# Patient Record
Sex: FEMALE | Race: OTHER | HISPANIC OR LATINO
[De-identification: names, ages, dates, MRNs, and addresses within clinical notes are randomized per-mention and may not be internally consistent; named-entity substitution may affect disease eponyms.]

---

## 2021-09-20 ENCOUNTER — ASOB RESULT (OUTPATIENT)
Age: 31
End: 2021-09-20

## 2021-09-20 ENCOUNTER — APPOINTMENT (OUTPATIENT)
Dept: ANTEPARTUM | Facility: CLINIC | Age: 31
End: 2021-09-20
Payer: MEDICAID

## 2021-09-20 PROBLEM — Z00.00 ENCOUNTER FOR PREVENTIVE HEALTH EXAMINATION: Status: ACTIVE | Noted: 2021-09-20

## 2021-09-20 PROCEDURE — 76805 OB US >/= 14 WKS SNGL FETUS: CPT

## 2021-10-18 ENCOUNTER — INPATIENT (INPATIENT)
Facility: HOSPITAL | Age: 31
LOS: 1 days | Discharge: HOME | End: 2021-10-20
Attending: OBSTETRICS & GYNECOLOGY | Admitting: OBSTETRICS & GYNECOLOGY
Payer: MEDICAID

## 2021-10-18 ENCOUNTER — TRANSCRIPTION ENCOUNTER (OUTPATIENT)
Age: 31
End: 2021-10-18

## 2021-10-18 VITALS
RESPIRATION RATE: 18 BRPM | TEMPERATURE: 98 F | HEIGHT: 62.2 IN | SYSTOLIC BLOOD PRESSURE: 131 MMHG | HEART RATE: 81 BPM | DIASTOLIC BLOOD PRESSURE: 61 MMHG | WEIGHT: 158.07 LBS

## 2021-10-18 DIAGNOSIS — O47.1 FALSE LABOR AT OR AFTER 37 COMPLETED WEEKS OF GESTATION: ICD-10-CM

## 2021-10-18 LAB
BASOPHILS # BLD AUTO: 0.04 K/UL — SIGNIFICANT CHANGE UP (ref 0–0.2)
BASOPHILS NFR BLD AUTO: 0.4 % — SIGNIFICANT CHANGE UP (ref 0–2)
EOSINOPHIL # BLD AUTO: 0.04 K/UL — SIGNIFICANT CHANGE UP (ref 0–0.5)
EOSINOPHIL NFR BLD AUTO: 0.4 % — SIGNIFICANT CHANGE UP (ref 0–6)
HCT VFR BLD CALC: 36.6 % — SIGNIFICANT CHANGE UP (ref 34.5–45)
HGB BLD-MCNC: 12.7 G/DL — SIGNIFICANT CHANGE UP (ref 11.5–15.5)
IMM GRANULOCYTES NFR BLD AUTO: 0.3 % — SIGNIFICANT CHANGE UP (ref 0–1.5)
LYMPHOCYTES # BLD AUTO: 1.57 K/UL — SIGNIFICANT CHANGE UP (ref 1–3.3)
LYMPHOCYTES # BLD AUTO: 16.2 % — SIGNIFICANT CHANGE UP (ref 13–44)
MCHC RBC-ENTMCNC: 32.4 PG — SIGNIFICANT CHANGE UP (ref 27–34)
MCHC RBC-ENTMCNC: 34.7 GM/DL — SIGNIFICANT CHANGE UP (ref 32–36)
MCV RBC AUTO: 93.4 FL — SIGNIFICANT CHANGE UP (ref 80–100)
MONOCYTES # BLD AUTO: 0.66 K/UL — SIGNIFICANT CHANGE UP (ref 0–0.9)
MONOCYTES NFR BLD AUTO: 6.8 % — SIGNIFICANT CHANGE UP (ref 2–14)
NEUTROPHILS # BLD AUTO: 7.34 K/UL — SIGNIFICANT CHANGE UP (ref 1.8–7.4)
NEUTROPHILS NFR BLD AUTO: 75.9 % — SIGNIFICANT CHANGE UP (ref 43–77)
PLATELET # BLD AUTO: 196 K/UL — SIGNIFICANT CHANGE UP (ref 150–400)
RBC # BLD: 3.92 M/UL — SIGNIFICANT CHANGE UP (ref 3.8–5.2)
RBC # FLD: 12.6 % — SIGNIFICANT CHANGE UP (ref 10.3–14.5)
WBC # BLD: 9.68 K/UL — SIGNIFICANT CHANGE UP (ref 3.8–10.5)
WBC # FLD AUTO: 9.68 K/UL — SIGNIFICANT CHANGE UP (ref 3.8–10.5)

## 2021-10-18 RX ORDER — ACETAMINOPHEN 500 MG
975 TABLET ORAL
Refills: 0 | Status: DISCONTINUED | OUTPATIENT
Start: 2021-10-18 | End: 2021-10-20

## 2021-10-18 RX ORDER — DIPHENHYDRAMINE HCL 50 MG
25 CAPSULE ORAL EVERY 6 HOURS
Refills: 0 | Status: DISCONTINUED | OUTPATIENT
Start: 2021-10-18 | End: 2021-10-20

## 2021-10-18 RX ORDER — LANOLIN
1 OINTMENT (GRAM) TOPICAL EVERY 6 HOURS
Refills: 0 | Status: DISCONTINUED | OUTPATIENT
Start: 2021-10-18 | End: 2021-10-20

## 2021-10-18 RX ORDER — OXYCODONE HYDROCHLORIDE 5 MG/1
5 TABLET ORAL ONCE
Refills: 0 | Status: DISCONTINUED | OUTPATIENT
Start: 2021-10-18 | End: 2021-10-20

## 2021-10-18 RX ORDER — DIBUCAINE 1 %
1 OINTMENT (GRAM) RECTAL EVERY 6 HOURS
Refills: 0 | Status: DISCONTINUED | OUTPATIENT
Start: 2021-10-18 | End: 2021-10-20

## 2021-10-18 RX ORDER — TETANUS TOXOID, REDUCED DIPHTHERIA TOXOID AND ACELLULAR PERTUSSIS VACCINE, ADSORBED 5; 2.5; 8; 8; 2.5 [IU]/.5ML; [IU]/.5ML; UG/.5ML; UG/.5ML; UG/.5ML
0.5 SUSPENSION INTRAMUSCULAR ONCE
Refills: 0 | Status: DISCONTINUED | OUTPATIENT
Start: 2021-10-18 | End: 2021-10-20

## 2021-10-18 RX ORDER — OXYTOCIN 10 UNIT/ML
333.33 VIAL (ML) INJECTION
Qty: 20 | Refills: 0 | Status: DISCONTINUED | OUTPATIENT
Start: 2021-10-18 | End: 2021-10-19

## 2021-10-18 RX ORDER — KETOROLAC TROMETHAMINE 30 MG/ML
30 SYRINGE (ML) INJECTION ONCE
Refills: 0 | Status: DISCONTINUED | OUTPATIENT
Start: 2021-10-18 | End: 2021-10-18

## 2021-10-18 RX ORDER — PRAMOXINE HYDROCHLORIDE 150 MG/15G
1 AEROSOL, FOAM RECTAL EVERY 4 HOURS
Refills: 0 | Status: DISCONTINUED | OUTPATIENT
Start: 2021-10-18 | End: 2021-10-20

## 2021-10-18 RX ORDER — BENZOCAINE 10 %
1 GEL (GRAM) MUCOUS MEMBRANE EVERY 6 HOURS
Refills: 0 | Status: DISCONTINUED | OUTPATIENT
Start: 2021-10-18 | End: 2021-10-20

## 2021-10-18 RX ORDER — AER TRAVELER 0.5 G/1
1 SOLUTION RECTAL; TOPICAL EVERY 4 HOURS
Refills: 0 | Status: DISCONTINUED | OUTPATIENT
Start: 2021-10-18 | End: 2021-10-20

## 2021-10-18 RX ORDER — IBUPROFEN 200 MG
600 TABLET ORAL EVERY 6 HOURS
Refills: 0 | Status: COMPLETED | OUTPATIENT
Start: 2021-10-18 | End: 2022-09-16

## 2021-10-18 RX ORDER — OXYCODONE HYDROCHLORIDE 5 MG/1
5 TABLET ORAL
Refills: 0 | Status: DISCONTINUED | OUTPATIENT
Start: 2021-10-18 | End: 2021-10-20

## 2021-10-18 RX ORDER — OXYTOCIN 10 UNIT/ML
333.33 VIAL (ML) INJECTION
Qty: 20 | Refills: 0 | Status: DISCONTINUED | OUTPATIENT
Start: 2021-10-18 | End: 2021-10-20

## 2021-10-18 RX ORDER — SODIUM CHLORIDE 9 MG/ML
3 INJECTION INTRAMUSCULAR; INTRAVENOUS; SUBCUTANEOUS EVERY 8 HOURS
Refills: 0 | Status: DISCONTINUED | OUTPATIENT
Start: 2021-10-18 | End: 2021-10-20

## 2021-10-18 RX ORDER — MAGNESIUM HYDROXIDE 400 MG/1
30 TABLET, CHEWABLE ORAL
Refills: 0 | Status: DISCONTINUED | OUTPATIENT
Start: 2021-10-18 | End: 2021-10-20

## 2021-10-18 RX ORDER — HYDROCORTISONE 1 %
1 OINTMENT (GRAM) TOPICAL EVERY 6 HOURS
Refills: 0 | Status: DISCONTINUED | OUTPATIENT
Start: 2021-10-18 | End: 2021-10-20

## 2021-10-18 RX ORDER — SODIUM CHLORIDE 9 MG/ML
1000 INJECTION, SOLUTION INTRAVENOUS
Refills: 0 | Status: DISCONTINUED | OUTPATIENT
Start: 2021-10-18 | End: 2021-10-19

## 2021-10-18 RX ORDER — CITRIC ACID/SODIUM CITRATE 300-500 MG
30 SOLUTION, ORAL ORAL ONCE
Refills: 0 | Status: DISCONTINUED | OUTPATIENT
Start: 2021-10-18 | End: 2021-10-19

## 2021-10-18 RX ORDER — SIMETHICONE 80 MG/1
80 TABLET, CHEWABLE ORAL EVERY 4 HOURS
Refills: 0 | Status: DISCONTINUED | OUTPATIENT
Start: 2021-10-18 | End: 2021-10-20

## 2021-10-18 RX ADMIN — Medication 30 MILLIGRAM(S): at 23:30

## 2021-10-18 RX ADMIN — Medication 30 MILLIGRAM(S): at 23:26

## 2021-10-18 RX ADMIN — Medication 1000 MILLIUNIT(S)/MIN: at 23:27

## 2021-10-18 NOTE — OB PROVIDER H&P - NSHPPHYSICALEXAM_GEN_ALL_CORE
Gen: AAOx3; in apparent pain with ctx  Resp: breathing comfortably on RA  Abd: Soft, gravid, nontender  SVE: 8, 80, 0, soft, intact bulging membranes  Bedside sono: cephalic, posterior placenta  FHT: baseline 135, moderate variability, +accels, -decels  [place]: ctx q2m

## 2021-10-18 NOTE — OB RN DELIVERY SUMMARY - NSSELHIDDEN_OBGYN_ALL_OB_FT
[NS_DeliveryAttending1_OBGYN_ALL_OB_FT:[id number]==],[NS_DeliveryAssist1_OBGYN_ALL_OB_FT:[email or web address]=],[NS_DeliveryRN_OBGYN_ALL_OB_FT:[id number]=]

## 2021-10-18 NOTE — OB PROVIDER DELIVERY SUMMARY - NSSELHIDDEN_OBGYN_ALL_OB_FT
[NS_DeliveryAttending1_OBGYN_ALL_OB_FT:[id number]==],[NS_DeliveryAssist1_OBGYN_ALL_OB_FT:[email or web address]=],[NS_DeliveryRN_OBGYN_ALL_OB_FT:[email or web address]=]

## 2021-10-18 NOTE — OB PROVIDER H&P - HISTORY OF PRESENT ILLNESS
31y  at 37w4d weeks GA by LMP who presents to L&D with painful, frequent ctx since  tonight. Patient denies vaginal bleeding and leakage of fluid. She endorses good fetal movement. Denies fevers, chills, nausea, vomiting, chest pain, SOB, dizziness and headache. No other complaints at this time.     KEILA: 21  LMP: 21    Prenatal course is significant for:  late NIKKIE from [facility]    POB: FT NSVDx1 2019  PGYN: -fibroids, -ovarian cysts, denies STD hx, denies abnormal PAPs   PMH: Denies  PSH: Denies  SH: Denies EtOH, tobacco and illicit drug use during this pregnancy; feels safe at home   Meds: PNVs  Allergies: NKDA    21  BMI: 25.97  Sono: vtx, post placenta  EFW: 2286g

## 2021-10-18 NOTE — OB PROVIDER DELIVERY SUMMARY - NSPROVIDERDELIVERYNOTE_OBGYN_ALL_OB_FT
pt presented in active labor, ctx frequently and 8cm dilated.  AROM clear, progressed to 10cm dilated.    @2256, nuchalx1 reduced, m apgars 9/9.  rt labial tear, not bleeding, no repair indicated.   Hemostasis noted, EBL 27.  Count correct x2

## 2021-10-18 NOTE — OB PROVIDER H&P - ASSESSMENT
31y  @37w4d admitted in active labor.     A/P:   -Admit to L&D  -Consent  -Admission labs  -NPO, except ice chips   -IV fluids  -Fetus: Cat I tracing. Continuous toco and fetal monitoring.   -GBS: Negative, no GBS ppx required   -Analgesia: epidural PRN    Discussed with Dr. Greene

## 2021-10-18 NOTE — OB RN DELIVERY SUMMARY - NS_SEPSISRSKCALC_OBGYN_ALL_OB_FT
EOS calculated successfully. EOS Risk Factor: 0.5/1000 live births ([facility] national incidence); GA=37w4d; Temp=97.9; ROM=0.117; GBS='Negative'; Antibiotics='No antibiotics or any antibiotics < 2 hrs prior to birth'

## 2021-10-19 LAB
ABO RH CONFIRMATION: SIGNIFICANT CHANGE UP
ALBUMIN SERPL ELPH-MCNC: 3.7 G/DL — SIGNIFICANT CHANGE UP (ref 3.3–5.2)
ALP SERPL-CCNC: 129 U/L — HIGH (ref 40–120)
ALT FLD-CCNC: 13 U/L — SIGNIFICANT CHANGE UP
ANION GAP SERPL CALC-SCNC: 16 MMOL/L — SIGNIFICANT CHANGE UP (ref 5–17)
APTT BLD: 26.1 SEC — LOW (ref 27.5–35.5)
AST SERPL-CCNC: 17 U/L — SIGNIFICANT CHANGE UP
BILIRUB SERPL-MCNC: <0.2 MG/DL — LOW (ref 0.4–2)
BLD GP AB SCN SERPL QL: SIGNIFICANT CHANGE UP
BUN SERPL-MCNC: 15.3 MG/DL — SIGNIFICANT CHANGE UP (ref 8–20)
CALCIUM SERPL-MCNC: 8.9 MG/DL — SIGNIFICANT CHANGE UP (ref 8.6–10.2)
CHLORIDE SERPL-SCNC: 103 MMOL/L — SIGNIFICANT CHANGE UP (ref 98–107)
CO2 SERPL-SCNC: 18 MMOL/L — LOW (ref 22–29)
COVID-19 SPIKE DOMAIN AB INTERP: POSITIVE
COVID-19 SPIKE DOMAIN ANTIBODY RESULT: >250 U/ML — HIGH
CREAT SERPL-MCNC: 0.55 MG/DL — SIGNIFICANT CHANGE UP (ref 0.5–1.3)
FIBRINOGEN PPP-MCNC: 682 MG/DL — HIGH (ref 290–520)
GLUCOSE SERPL-MCNC: 83 MG/DL — SIGNIFICANT CHANGE UP (ref 70–99)
HCT VFR BLD CALC: 32.5 % — LOW (ref 34.5–45)
HGB BLD-MCNC: 11.2 G/DL — LOW (ref 11.5–15.5)
INR BLD: 0.89 RATIO — SIGNIFICANT CHANGE UP (ref 0.88–1.16)
LDH SERPL L TO P-CCNC: 214 U/L — HIGH (ref 98–192)
POTASSIUM SERPL-MCNC: 3.6 MMOL/L — SIGNIFICANT CHANGE UP (ref 3.5–5.3)
POTASSIUM SERPL-SCNC: 3.6 MMOL/L — SIGNIFICANT CHANGE UP (ref 3.5–5.3)
PROT SERPL-MCNC: 7 G/DL — SIGNIFICANT CHANGE UP (ref 6.6–8.7)
PROTHROM AB SERPL-ACNC: 10.4 SEC — LOW (ref 10.6–13.6)
SARS-COV-2 IGG+IGM SERPL QL IA: >250 U/ML — HIGH
SARS-COV-2 IGG+IGM SERPL QL IA: POSITIVE
SARS-COV-2 RNA SPEC QL NAA+PROBE: SIGNIFICANT CHANGE UP
SODIUM SERPL-SCNC: 137 MMOL/L — SIGNIFICANT CHANGE UP (ref 135–145)
URATE SERPL-MCNC: 4.4 MG/DL — SIGNIFICANT CHANGE UP (ref 2.4–5.7)

## 2021-10-19 RX ORDER — IBUPROFEN 200 MG
600 TABLET ORAL EVERY 6 HOURS
Refills: 0 | Status: DISCONTINUED | OUTPATIENT
Start: 2021-10-19 | End: 2021-10-20

## 2021-10-19 RX ADMIN — Medication 600 MILLIGRAM(S): at 19:28

## 2021-10-19 RX ADMIN — Medication 975 MILLIGRAM(S): at 09:10

## 2021-10-19 RX ADMIN — Medication 1 TABLET(S): at 11:55

## 2021-10-19 RX ADMIN — Medication 600 MILLIGRAM(S): at 12:55

## 2021-10-19 RX ADMIN — Medication 600 MILLIGRAM(S): at 11:56

## 2021-10-19 RX ADMIN — SODIUM CHLORIDE 3 MILLILITER(S): 9 INJECTION INTRAMUSCULAR; INTRAVENOUS; SUBCUTANEOUS at 22:36

## 2021-10-19 RX ADMIN — Medication 975 MILLIGRAM(S): at 10:10

## 2021-10-19 RX ADMIN — Medication 975 MILLIGRAM(S): at 16:45

## 2021-10-19 RX ADMIN — Medication 975 MILLIGRAM(S): at 15:47

## 2021-10-19 RX ADMIN — Medication 600 MILLIGRAM(S): at 18:34

## 2021-10-19 NOTE — DISCHARGE NOTE OB - CARE PLAN
1 Principal Discharge DX:	 (normal spontaneous vaginal delivery)  Assessment and plan of treatment:	Please call your provider to schedule postpartum visit in 6 weeks. Take medications as directed, regular diet, activity as tolerated. Exclusive breast feeding for the first 6 months is recommended. Nothing per vagina for 6 weeks (incl. sex, douching, etc). If you have additional concerns, please inform your provider.

## 2021-10-19 NOTE — PROGRESS NOTE ADULT - ASSESSMENT
A/P: 31y  now PPD#1 s/p spontaneous vaginal delivery at 37.4 weeks gestation, uncomplicated.  -Vital signs stable  -Hgb: 12.7 -> AM labs pending   -Voiding, tolerating PO, bowel function nml   -Advance care as tolerated   -Continue routine postpartum care and education  -Healthy male infant, declines circumcision  -Dispo: Pt is stable for discharge to home pending attending approval.

## 2021-10-19 NOTE — DISCHARGE NOTE OB - PATIENT PORTAL LINK FT
You can access the FollowMyHealth Patient Portal offered by [facility] by registering at the following website: [email or web address]. By joining [facility]’s FollowMyHealth portal, you will also be able to view your health information using other applications (apps) compatible with our system.

## 2021-10-19 NOTE — DISCHARGE NOTE OB - HOSPITAL COURSE
Patient underwent a normal spontaneous vaginal delivery. Post-partum course was uncomplicated. Pain is well controlled with PRN medication. She has no difficulty with ambulation, voiding, or PO intake. Lab values and vital signs are stable prior to discharge.

## 2021-10-19 NOTE — DISCHARGE NOTE OB - CARE PROVIDER_API CALL
Jt Mckeon)  Obstetrics and Gynecology  [address] [place] [address]  Phone: [phone]  Fax: [phone]  Follow Up Time:

## 2021-10-20 VITALS
RESPIRATION RATE: 18 BRPM | DIASTOLIC BLOOD PRESSURE: 80 MMHG | HEART RATE: 72 BPM | SYSTOLIC BLOOD PRESSURE: 128 MMHG | TEMPERATURE: 98 F

## 2021-10-20 LAB
MEV IGG SER-ACNC: 12.5 AU/ML — SIGNIFICANT CHANGE UP
MEV IGG+IGM SER-IMP: NEGATIVE — SIGNIFICANT CHANGE UP
T PALLIDUM AB TITR SER: NEGATIVE — SIGNIFICANT CHANGE UP

## 2021-10-20 PROCEDURE — 85610 PROTHROMBIN TIME: CPT

## 2021-10-20 PROCEDURE — 90707 MMR VACCINE SC: CPT

## 2021-10-20 PROCEDURE — 85384 FIBRINOGEN ACTIVITY: CPT

## 2021-10-20 PROCEDURE — 59050 FETAL MONITOR W/REPORT: CPT

## 2021-10-20 PROCEDURE — 87635 SARS-COV-2 COVID-19 AMP PRB: CPT

## 2021-10-20 PROCEDURE — 85014 HEMATOCRIT: CPT

## 2021-10-20 PROCEDURE — 84550 ASSAY OF BLOOD/URIC ACID: CPT

## 2021-10-20 PROCEDURE — 86850 RBC ANTIBODY SCREEN: CPT

## 2021-10-20 PROCEDURE — 85018 HEMOGLOBIN: CPT

## 2021-10-20 PROCEDURE — 85730 THROMBOPLASTIN TIME PARTIAL: CPT

## 2021-10-20 PROCEDURE — 36415 COLL VENOUS BLD VENIPUNCTURE: CPT

## 2021-10-20 PROCEDURE — G0463: CPT

## 2021-10-20 PROCEDURE — 85025 COMPLETE CBC W/AUTO DIFF WBC: CPT

## 2021-10-20 PROCEDURE — 80053 COMPREHEN METABOLIC PANEL: CPT

## 2021-10-20 PROCEDURE — 86901 BLOOD TYPING SEROLOGIC RH(D): CPT

## 2021-10-20 PROCEDURE — 83615 LACTATE (LD) (LDH) ENZYME: CPT

## 2021-10-20 PROCEDURE — 86765 RUBEOLA ANTIBODY: CPT

## 2021-10-20 PROCEDURE — 59025 FETAL NON-STRESS TEST: CPT

## 2021-10-20 PROCEDURE — 86769 SARS-COV-2 COVID-19 ANTIBODY: CPT

## 2021-10-20 PROCEDURE — 86900 BLOOD TYPING SEROLOGIC ABO: CPT

## 2021-10-20 PROCEDURE — 86780 TREPONEMA PALLIDUM: CPT

## 2021-10-20 RX ORDER — ACETAMINOPHEN 500 MG
3 TABLET ORAL
Qty: 0 | Refills: 0 | DISCHARGE
Start: 2021-10-20

## 2021-10-20 RX ORDER — IBUPROFEN 200 MG
1 TABLET ORAL
Qty: 0 | Refills: 0 | DISCHARGE
Start: 2021-10-20

## 2021-10-20 RX ADMIN — Medication 0.5 MILLILITER(S): at 10:38

## 2021-10-20 RX ADMIN — SODIUM CHLORIDE 3 MILLILITER(S): 9 INJECTION INTRAMUSCULAR; INTRAVENOUS; SUBCUTANEOUS at 05:57

## 2021-10-20 NOTE — PROGRESS NOTE ADULT - ASSESSMENT
A/P: 31y  now PPD#2 s/p spontaneous vaginal delivery at 37.4 weeks gestation, uncomplicated.  -Vital signs stable  -Hgb: 12.7 -> 11.2  -Voiding, tolerating PO, bowel function nml   -Advance care as tolerated   -Continue routine postpartum care and education  -Healthy male infant, declines circumcision  -Dispo: Pt is stable for discharge to home pending attending approval.

## 2021-10-20 NOTE — PROGRESS NOTE ADULT - ATTENDING COMMENTS
31y  now PPD#1  no complaints  doing well  abd soft, uterus firm   ext no edema  plan   Routine Pp care  DVT ppx
PPD#2  Doing well  meeting milestones  dc home today  f/u in 4-6 weeks  questions and concerns answered  ppalos

## 2022-04-28 NOTE — OB RN PATIENT PROFILE - PRO PRENATAL LABS ORI SOURCE HBSAG
This is a recent snapshot of the patient's [facility] medical record.  For current drug dose and complete information and questions, call [phone] or In Basket pool, fv home infusion ([id number])  CSN Number:  [id number]    
hard copy, drawn during this pregnancy

## 2024-01-17 PROBLEM — Z78.9 OTHER SPECIFIED HEALTH STATUS: Chronic | Status: ACTIVE | Noted: 2021-10-18

## 2024-02-12 ENCOUNTER — APPOINTMENT (OUTPATIENT)
Dept: OBGYN | Facility: CLINIC | Age: 34
End: 2024-02-12
Payer: COMMERCIAL

## 2024-02-12 ENCOUNTER — APPOINTMENT (OUTPATIENT)
Dept: ANTEPARTUM | Facility: CLINIC | Age: 34
End: 2024-02-12
Payer: COMMERCIAL

## 2024-02-12 ENCOUNTER — ASOB RESULT (OUTPATIENT)
Age: 34
End: 2024-02-12

## 2024-02-12 ENCOUNTER — LABORATORY RESULT (OUTPATIENT)
Age: 34
End: 2024-02-12

## 2024-02-12 VITALS
WEIGHT: 120 LBS | SYSTOLIC BLOOD PRESSURE: 116 MMHG | DIASTOLIC BLOOD PRESSURE: 72 MMHG | TEMPERATURE: 98 F | BODY MASS INDEX: 22.08 KG/M2 | HEIGHT: 62 IN

## 2024-02-12 DIAGNOSIS — N91.1 SECONDARY AMENORRHEA: ICD-10-CM

## 2024-02-12 DIAGNOSIS — Z31.49 ENCOUNTER FOR OTHER PROCREATIVE INVESTIGATION AND TESTING: ICD-10-CM

## 2024-02-12 DIAGNOSIS — Z11.3 ENCOUNTER FOR SCREENING FOR INFECTIONS WITH A PREDOMINANTLY SEXUAL MODE OF TRANSMISSION: ICD-10-CM

## 2024-02-12 DIAGNOSIS — Z11.51 ENCOUNTER FOR SCREENING FOR HUMAN PAPILLOMAVIRUS (HPV): ICD-10-CM

## 2024-02-12 DIAGNOSIS — Z82.69 FAMILY HISTORY OF OTHER DISEASES OF THE MUSCULOSKELETAL SYSTEM AND CONNECTIVE TISSUE: ICD-10-CM

## 2024-02-12 DIAGNOSIS — Z12.4 ENCOUNTER FOR SCREENING FOR MALIGNANT NEOPLASM OF CERVIX: ICD-10-CM

## 2024-02-12 DIAGNOSIS — Z78.9 OTHER SPECIFIED HEALTH STATUS: ICD-10-CM

## 2024-02-12 DIAGNOSIS — Z01.419 ENCOUNTER FOR GYNECOLOGICAL EXAMINATION (GENERAL) (ROUTINE) W/OUT ABNORMAL FINDINGS: ICD-10-CM

## 2024-02-12 LAB
APPEARANCE: CLEAR
BILIRUBIN URINE: NEGATIVE
BLOOD URINE: NEGATIVE
COLOR: YELLOW
GLUCOSE QUALITATIVE U: NEGATIVE
KETONES URINE: NEGATIVE
LEUKOCYTE ESTERASE URINE: NEGATIVE
NITRITE URINE: NEGATIVE
PH URINE: 8.5
PROTEIN URINE: NEGATIVE
SPECIFIC GRAVITY URINE: 1.02
UROBILINOGEN URINE: 0.2 (ref 0.2–?)

## 2024-02-12 PROCEDURE — 0500F INITIAL PRENATAL CARE VISIT: CPT

## 2024-02-12 PROCEDURE — 76817 TRANSVAGINAL US OBSTETRIC: CPT

## 2024-02-12 PROCEDURE — 99385 PREV VISIT NEW AGE 18-39: CPT

## 2024-02-13 LAB
ABO + RH PNL BLD: NORMAL
BASOPHILS # BLD AUTO: 0.07 K/UL
BASOPHILS NFR BLD AUTO: 0.7 %
BLD GP AB SCN SERPL QL: NORMAL
C TRACH RRNA SPEC QL NAA+PROBE: NOT DETECTED
EOSINOPHIL # BLD AUTO: 0.07 K/UL
EOSINOPHIL NFR BLD AUTO: 0.7 %
ESTIMATED AVERAGE GLUCOSE: 103 MG/DL
HBA1C MFR BLD HPLC: 5.2 %
HBV SURFACE AG SER QL: NONREACTIVE
HCT VFR BLD CALC: 38.1 %
HCV AB SER QL: NONREACTIVE
HCV S/CO RATIO: 0.29 S/CO
HGB BLD-MCNC: 12.1 G/DL
HIV1+2 AB SPEC QL IA.RAPID: NONREACTIVE
HPV HIGH+LOW RISK DNA PNL CVX: NOT DETECTED
IMM GRANULOCYTES NFR BLD AUTO: 0.3 %
LYMPHOCYTES # BLD AUTO: 1.81 K/UL
LYMPHOCYTES NFR BLD AUTO: 17.5 %
MAN DIFF?: NORMAL
MCHC RBC-ENTMCNC: 29.3 PG
MCHC RBC-ENTMCNC: 31.8 GM/DL
MCV RBC AUTO: 92.3 FL
MONOCYTES # BLD AUTO: 0.77 K/UL
MONOCYTES NFR BLD AUTO: 7.4 %
N GONORRHOEA RRNA SPEC QL NAA+PROBE: NOT DETECTED
NEUTROPHILS # BLD AUTO: 7.59 K/UL
NEUTROPHILS NFR BLD AUTO: 73.4 %
PLATELET # BLD AUTO: 335 K/UL
RBC # BLD: 4.13 M/UL
RBC # FLD: 13.2 %
SOURCE TP AMPLIFICATION: NORMAL
T PALLIDUM AB SER QL IA: NEGATIVE
TSH SERPL-ACNC: 1.4 UIU/ML
WBC # FLD AUTO: 10.34 K/UL

## 2024-02-14 LAB
BACTERIA UR CULT: NORMAL
MEV IGG FLD QL IA: >300 AU/ML
MEV IGG+IGM SER-IMP: POSITIVE
RUBV IGG FLD-ACNC: 2.7 INDEX
RUBV IGG SER-IMP: POSITIVE
VZV AB TITR SER: POSITIVE
VZV IGG SER IF-ACNC: 2537 INDEX

## 2024-02-16 ENCOUNTER — NON-APPOINTMENT (OUTPATIENT)
Age: 34
End: 2024-02-16

## 2024-02-17 LAB
CYTOLOGY CVX/VAG DOC THIN PREP: NORMAL
M TB IFN-G BLD-IMP: NEGATIVE
QUANTIFERON TB PLUS MITOGEN MINUS NIL: 6.03 IU/ML
QUANTIFERON TB PLUS NIL: 0.02 IU/ML
QUANTIFERON TB PLUS TB1 MINUS NIL: 0 IU/ML
QUANTIFERON TB PLUS TB2 MINUS NIL: 0.01 IU/ML

## 2024-02-18 PROBLEM — Z82.69 FAMILY HISTORY OF SYSTEMIC LUPUS ERYTHEMATOSUS: Status: ACTIVE | Noted: 2024-02-12

## 2024-02-18 NOTE — HISTORY OF PRESENT ILLNESS
[FreeTextEntry1] : HPI:  NEW patient annual c/o of secondary amenorrhea, presents for US Denies Vaginal bleeding or Pelvic Pain.  LMP: 12/10/23  OBhx:  2019  38 wk Female. No PEC/No GDM. [facility].    38 wk Male . No PEC. No GDM. [facility].  GYNhx: denies sti/hsv PMH: denies  PSH: denies  ALL: nkda  Meds: PNV Sochx: neg x 3 FAMhx: denies breast, ovarian, colon, uterine cancer  LMP: 12/10/23  US: 24 CRL 9w1d. . SLIUP.   3.9 cm subchorionic hematoma   BMI:  21   a/p: 34 y/o  @ 9w1d by LMP c/w today's US.  #secondary amenorrhea:  #PNC: -see Prenatal record   #NEW gyn annual  -ASCCP guidelines reviewed -pap, gc/c, trich   rto 3 wk [ ] nuchal

## 2024-02-18 NOTE — PHYSICAL EXAM
[Chaperone Present] : A chaperone was present in the examining room during all aspects of the physical examination [FreeTextEntry1] : AURA [Appropriately responsive] : appropriately responsive [Alert] : alert [No Acute Distress] : no acute distress [No Lymphadenopathy] : no lymphadenopathy [Soft] : soft [Non-tender] : non-tender [Non-distended] : non-distended [Oriented x3] : oriented x3 [FreeTextEntry3] : mobile thyroid, no masses, no nodules [FreeTextEntry6] : No cervical or axillary lymphadenopathy. [Examination Of The Breasts] : a normal appearance [No Masses] : no breast masses were palpable [Labia Majora] : normal [Labia Minora] : normal [Normal] : normal [Uterine Adnexae] : normal

## 2024-03-04 ENCOUNTER — ASOB RESULT (OUTPATIENT)
Age: 34
End: 2024-03-04

## 2024-03-04 ENCOUNTER — NON-APPOINTMENT (OUTPATIENT)
Age: 34
End: 2024-03-04

## 2024-03-04 ENCOUNTER — APPOINTMENT (OUTPATIENT)
Dept: ANTEPARTUM | Facility: CLINIC | Age: 34
End: 2024-03-04
Payer: MEDICAID

## 2024-03-04 ENCOUNTER — APPOINTMENT (OUTPATIENT)
Dept: OBGYN | Facility: CLINIC | Age: 34
End: 2024-03-04
Payer: MEDICAID

## 2024-03-04 VITALS
DIASTOLIC BLOOD PRESSURE: 60 MMHG | BODY MASS INDEX: 22.63 KG/M2 | HEIGHT: 62 IN | SYSTOLIC BLOOD PRESSURE: 102 MMHG | WEIGHT: 123 LBS

## 2024-03-04 DIAGNOSIS — Z34.91 ENCOUNTER FOR SUPERVISION OF NORMAL PREGNANCY, UNSPECIFIED, FIRST TRIMESTER: ICD-10-CM

## 2024-03-04 LAB
APPEARANCE: CLEAR
BILIRUBIN URINE: NEGATIVE
BLOOD URINE: NEGATIVE
COLOR: YELLOW
GLUCOSE QUALITATIVE U: NEGATIVE
KETONES URINE: NEGATIVE
LEUKOCYTE ESTERASE URINE: ABNORMAL
NITRITE URINE: NEGATIVE
PH URINE: 5.5
PROTEIN URINE: NEGATIVE
SPECIFIC GRAVITY URINE: 1.01
UROBILINOGEN URINE: 0.2 (ref 0.2–?)

## 2024-03-04 PROCEDURE — 76813 OB US NUCHAL MEAS 1 GEST: CPT

## 2024-03-04 PROCEDURE — 0502F SUBSEQUENT PRENATAL CARE: CPT

## 2024-03-04 RX ORDER — PRENATAL VIT 49/IRON FUM/FOLIC 6.75-0.2MG
TABLET ORAL
Refills: 0 | Status: ACTIVE | COMMUNITY

## 2024-03-05 ENCOUNTER — NON-APPOINTMENT (OUTPATIENT)
Age: 34
End: 2024-03-05

## 2024-03-06 ENCOUNTER — NON-APPOINTMENT (OUTPATIENT)
Age: 34
End: 2024-03-06

## 2024-03-06 DIAGNOSIS — N76.0 ACUTE VAGINITIS: ICD-10-CM

## 2024-03-06 DIAGNOSIS — B96.89 ACUTE VAGINITIS: ICD-10-CM

## 2024-03-06 LAB
CANDIDA VAG CYTO: DETECTED
G VAGINALIS+PREV SP MTYP VAG QL MICRO: NOT DETECTED
T VAGINALIS VAG QL WET PREP: NOT DETECTED

## 2024-03-06 RX ORDER — METRONIDAZOLE 7.5 MG/G
0.75 GEL VAGINAL
Qty: 1 | Refills: 0 | Status: ACTIVE | COMMUNITY
Start: 2024-03-06 | End: 1900-01-01

## 2024-03-11 ENCOUNTER — NON-APPOINTMENT (OUTPATIENT)
Age: 34
End: 2024-03-11

## 2024-04-01 ENCOUNTER — APPOINTMENT (OUTPATIENT)
Dept: OBGYN | Facility: CLINIC | Age: 34
End: 2024-04-01
Payer: MEDICAID

## 2024-04-01 VITALS
SYSTOLIC BLOOD PRESSURE: 112 MMHG | DIASTOLIC BLOOD PRESSURE: 70 MMHG | BODY MASS INDEX: 23.37 KG/M2 | WEIGHT: 127 LBS | HEIGHT: 62 IN

## 2024-04-01 LAB
APPEARANCE: CLEAR
BILIRUBIN URINE: NEGATIVE
BLOOD URINE: NEGATIVE
COLOR: YELLOW
GLUCOSE QUALITATIVE U: NEGATIVE
KETONES URINE: NEGATIVE
LEUKOCYTE ESTERASE URINE: NEGATIVE
NITRITE URINE: NEGATIVE
PH URINE: 7
PROTEIN URINE: NEGATIVE
SPECIFIC GRAVITY URINE: 1.02
UROBILINOGEN URINE: 1 (ref 0.2–?)

## 2024-04-01 PROCEDURE — 0502F SUBSEQUENT PRENATAL CARE: CPT

## 2024-04-16 LAB
AFP MOM: 0.61
AFP VALUE: 23.3 NG/ML
ALPHA FETOPROTEIN SERUM COMMENT: NORMAL
ALPHA FETOPROTEIN SERUM INTERPRETATION: NORMAL
ALPHA FETOPROTEIN SERUM RESULTS: NORMAL
ALPHA FETOPROTEIN SERUM TEST RESULTS: NORMAL
GESTATIONAL AGE BASED ON: NORMAL
GESTATIONAL AGE ON COLLECTION DATE: 16.1 WEEKS
INSULIN DEP DIABETES: NO
MATERNAL AGE AT EDD AFP: 33.3 YR
MULTIPLE GESTATION: NO
OSBR RISK 1 IN: NORMAL
RACE: NORMAL
WEIGHT AFP: 127 LBS

## 2024-04-17 ENCOUNTER — NON-APPOINTMENT (OUTPATIENT)
Age: 34
End: 2024-04-17

## 2024-04-30 ENCOUNTER — APPOINTMENT (OUTPATIENT)
Dept: ANTEPARTUM | Facility: CLINIC | Age: 34
End: 2024-04-30

## 2024-05-06 ENCOUNTER — APPOINTMENT (OUTPATIENT)
Dept: OBGYN | Facility: CLINIC | Age: 34
End: 2024-05-06
Payer: MEDICAID

## 2024-05-06 ENCOUNTER — APPOINTMENT (OUTPATIENT)
Dept: ANTEPARTUM | Facility: CLINIC | Age: 34
End: 2024-05-06
Payer: MEDICAID

## 2024-05-06 ENCOUNTER — ASOB RESULT (OUTPATIENT)
Age: 34
End: 2024-05-06

## 2024-05-06 ENCOUNTER — NON-APPOINTMENT (OUTPATIENT)
Age: 34
End: 2024-05-06

## 2024-05-06 VITALS
HEIGHT: 62 IN | DIASTOLIC BLOOD PRESSURE: 56 MMHG | SYSTOLIC BLOOD PRESSURE: 102 MMHG | WEIGHT: 132 LBS | BODY MASS INDEX: 24.29 KG/M2

## 2024-05-06 LAB
APPEARANCE: CLEAR
BILIRUBIN URINE: NEGATIVE
BLOOD URINE: NEGATIVE
COLOR: YELLOW
GLUCOSE QUALITATIVE U: NEGATIVE
KETONES URINE: NEGATIVE
LEUKOCYTE ESTERASE URINE: ABNORMAL
NITRITE URINE: NEGATIVE
PH URINE: 7.5
PROTEIN URINE: NEGATIVE
SPECIFIC GRAVITY URINE: 1.02
UROBILINOGEN URINE: 1 (ref 0.2–?)

## 2024-05-06 PROCEDURE — 76811 OB US DETAILED SNGL FETUS: CPT

## 2024-05-06 PROCEDURE — 76817 TRANSVAGINAL US OBSTETRIC: CPT

## 2024-05-06 PROCEDURE — 0502F SUBSEQUENT PRENATAL CARE: CPT

## 2024-06-10 ENCOUNTER — APPOINTMENT (OUTPATIENT)
Dept: OBGYN | Facility: CLINIC | Age: 34
End: 2024-06-10
Payer: MEDICAID

## 2024-06-10 VITALS
WEIGHT: 140 LBS | SYSTOLIC BLOOD PRESSURE: 100 MMHG | BODY MASS INDEX: 25.76 KG/M2 | HEIGHT: 62 IN | DIASTOLIC BLOOD PRESSURE: 70 MMHG

## 2024-06-10 DIAGNOSIS — Z34.92 ENCOUNTER FOR SUPERVISION OF NORMAL PREGNANCY, UNSPECIFIED, SECOND TRIMESTER: ICD-10-CM

## 2024-06-10 LAB
APPEARANCE: CLEAR
BILIRUBIN URINE: NEGATIVE
BLOOD URINE: NEGATIVE
COLOR: YELLOW
GLUCOSE QUALITATIVE U: NEGATIVE
KETONES URINE: NEGATIVE
LEUKOCYTE ESTERASE URINE: ABNORMAL
NITRITE URINE: NEGATIVE
PH URINE: 6
PROTEIN URINE: ABNORMAL
SPECIFIC GRAVITY URINE: >=1.03
UROBILINOGEN URINE: 0.2 (ref 0.2–?)

## 2024-06-10 PROCEDURE — 0502F SUBSEQUENT PRENATAL CARE: CPT

## 2024-06-20 ENCOUNTER — NON-APPOINTMENT (OUTPATIENT)
Age: 34
End: 2024-06-20

## 2024-06-20 DIAGNOSIS — O99.019 ANEMIA COMPLICATING PREGNANCY, UNSPECIFIED TRIMESTER: ICD-10-CM

## 2024-06-20 LAB
BASOPHILS # BLD AUTO: 0.05 K/UL
BASOPHILS NFR BLD AUTO: 0.6 %
EOSINOPHIL # BLD AUTO: 0.06 K/UL
EOSINOPHIL NFR BLD AUTO: 0.7 %
GLUCOSE 1H P 50 G GLC PO SERPL-MCNC: 126 MG/DL
HCT VFR BLD CALC: 32.6 %
HGB BLD-MCNC: 10.5 G/DL
IMM GRANULOCYTES NFR BLD AUTO: 0.6 %
LYMPHOCYTES # BLD AUTO: 1.47 K/UL
LYMPHOCYTES NFR BLD AUTO: 16.7 %
MAN DIFF?: NORMAL
MCHC RBC-ENTMCNC: 30.5 PG
MCHC RBC-ENTMCNC: 32.2 GM/DL
MCV RBC AUTO: 94.8 FL
MONOCYTES # BLD AUTO: 0.62 K/UL
MONOCYTES NFR BLD AUTO: 7.1 %
NEUTROPHILS # BLD AUTO: 6.53 K/UL
NEUTROPHILS NFR BLD AUTO: 74.3 %
PLATELET # BLD AUTO: 230 K/UL
RBC # BLD: 3.44 M/UL
RBC # FLD: 13.3 %
WBC # FLD AUTO: 8.78 K/UL

## 2024-06-20 RX ORDER — CHLORHEXIDINE GLUCONATE 4 %
325 (65 FE) LIQUID (ML) TOPICAL DAILY
Qty: 30 | Refills: 2 | Status: ACTIVE | COMMUNITY
Start: 2024-06-20 | End: 1900-01-01

## 2024-07-05 ENCOUNTER — APPOINTMENT (OUTPATIENT)
Dept: OBGYN | Facility: CLINIC | Age: 34
End: 2024-07-05
Payer: MEDICAID

## 2024-07-05 VITALS
BODY MASS INDEX: 26.68 KG/M2 | HEIGHT: 62 IN | WEIGHT: 145 LBS | SYSTOLIC BLOOD PRESSURE: 102 MMHG | DIASTOLIC BLOOD PRESSURE: 64 MMHG

## 2024-07-05 DIAGNOSIS — Z34.93 ENCOUNTER FOR SUPERVISION OF NORMAL PREGNANCY, UNSPECIFIED, THIRD TRIMESTER: ICD-10-CM

## 2024-07-05 LAB
APPEARANCE: CLEAR
BILIRUBIN URINE: NEGATIVE
BLOOD URINE: NEGATIVE
COLOR: YELLOW
GLUCOSE QUALITATIVE U: NEGATIVE
KETONES URINE: ABNORMAL
LEUKOCYTE ESTERASE URINE: NEGATIVE
NITRITE URINE: NEGATIVE
PH URINE: 6
PROTEIN URINE: NEGATIVE
SPECIFIC GRAVITY URINE: 1.02
UROBILINOGEN URINE: 1 (ref 0.2–?)

## 2024-07-05 PROCEDURE — 0502F SUBSEQUENT PRENATAL CARE: CPT

## 2024-07-09 NOTE — OB PROVIDER H&P - LIVING CHILDREN, OB PROFILE
1
PAST MEDICAL HISTORY:  Autism     Cataract     Constipation     Dental caries     Depression, major     Gait abnormality     Hernia of abdominal wall     HLD (hyperlipidemia)     Hypersalivation     Hypothyroidism     Mentally disabled     Nonverbal     OP (osteoporosis)     Profound mental retardation

## 2024-07-11 PROBLEM — Z34.93 THIRD TRIMESTER PREGNANCY: Status: ACTIVE | Noted: 2024-07-11

## 2024-07-15 ENCOUNTER — ASOB RESULT (OUTPATIENT)
Age: 34
End: 2024-07-15

## 2024-07-15 ENCOUNTER — APPOINTMENT (OUTPATIENT)
Dept: ANTEPARTUM | Facility: CLINIC | Age: 34
End: 2024-07-15
Payer: MEDICAID

## 2024-07-15 PROCEDURE — 76819 FETAL BIOPHYS PROFIL W/O NST: CPT

## 2024-07-15 PROCEDURE — 76816 OB US FOLLOW-UP PER FETUS: CPT

## 2024-07-30 ENCOUNTER — NON-APPOINTMENT (OUTPATIENT)
Age: 34
End: 2024-07-30

## 2024-07-30 ENCOUNTER — LABORATORY RESULT (OUTPATIENT)
Age: 34
End: 2024-07-30

## 2024-07-30 ENCOUNTER — APPOINTMENT (OUTPATIENT)
Dept: OBGYN | Facility: CLINIC | Age: 34
End: 2024-07-30
Payer: MEDICAID

## 2024-07-30 VITALS
DIASTOLIC BLOOD PRESSURE: 60 MMHG | HEIGHT: 62 IN | SYSTOLIC BLOOD PRESSURE: 118 MMHG | WEIGHT: 148 LBS | BODY MASS INDEX: 27.23 KG/M2

## 2024-07-30 DIAGNOSIS — Z34.93 ENCOUNTER FOR SUPERVISION OF NORMAL PREGNANCY, UNSPECIFIED, THIRD TRIMESTER: ICD-10-CM

## 2024-07-30 LAB
APPEARANCE: CLEAR
BILIRUBIN URINE: NEGATIVE
BLOOD URINE: NEGATIVE
COLOR: YELLOW
GLUCOSE QUALITATIVE U: NEGATIVE
KETONES URINE: NEGATIVE
LEUKOCYTE ESTERASE URINE: ABNORMAL
NITRITE URINE: NEGATIVE
PH URINE: 5.5
PROTEIN URINE: NEGATIVE
SPECIFIC GRAVITY URINE: >=1.03
UROBILINOGEN URINE: 0.2 (ref 0.2–?)

## 2024-07-30 PROCEDURE — 90715 TDAP VACCINE 7 YRS/> IM: CPT

## 2024-07-30 PROCEDURE — 90471 IMMUNIZATION ADMIN: CPT

## 2024-07-30 PROCEDURE — 0502F SUBSEQUENT PRENATAL CARE: CPT

## 2024-07-31 LAB
HCT VFR BLD CALC: 36.8 %
HGB BLD-MCNC: 11.6 G/DL
HIV1+2 AB SPEC QL IA.RAPID: NONREACTIVE
MCHC RBC-ENTMCNC: 30.4 PG
MCHC RBC-ENTMCNC: 31.5 GM/DL
MCV RBC AUTO: 96.6 FL
PLATELET # BLD AUTO: 210 K/UL
RBC # BLD: 3.81 M/UL
RBC # FLD: 14.1 %
T PALLIDUM AB SER QL IA: NEGATIVE
WBC # FLD AUTO: 7.75 K/UL

## 2024-08-01 ENCOUNTER — NON-APPOINTMENT (OUTPATIENT)
Age: 34
End: 2024-08-01

## 2024-08-01 LAB
HCV AB SER QL: ABNORMAL
HCV S/CO RATIO: 0.93 S/CO

## 2024-08-13 ENCOUNTER — APPOINTMENT (OUTPATIENT)
Dept: OBGYN | Facility: CLINIC | Age: 34
End: 2024-08-13
Payer: MEDICAID

## 2024-08-13 VITALS
WEIGHT: 149 LBS | DIASTOLIC BLOOD PRESSURE: 70 MMHG | SYSTOLIC BLOOD PRESSURE: 108 MMHG | HEIGHT: 62 IN | BODY MASS INDEX: 27.42 KG/M2

## 2024-08-13 LAB
APPEARANCE: CLEAR
BILIRUBIN URINE: NEGATIVE
BLOOD URINE: NEGATIVE
COLOR: YELLOW
GLUCOSE QUALITATIVE U: NEGATIVE
KETONES URINE: NEGATIVE
LEUKOCYTE ESTERASE URINE: ABNORMAL
NITRITE URINE: NEGATIVE
PH URINE: 6
PROTEIN URINE: NEGATIVE
SPECIFIC GRAVITY URINE: 1.02
UROBILINOGEN URINE: 0.2 (ref 0.2–?)

## 2024-08-13 PROCEDURE — 0502F SUBSEQUENT PRENATAL CARE: CPT

## 2024-08-20 ENCOUNTER — NON-APPOINTMENT (OUTPATIENT)
Age: 34
End: 2024-08-20

## 2024-08-21 ENCOUNTER — APPOINTMENT (OUTPATIENT)
Dept: OBGYN | Facility: CLINIC | Age: 34
End: 2024-08-21
Payer: MEDICAID

## 2024-08-21 VITALS — BODY MASS INDEX: 27.97 KG/M2 | HEIGHT: 62 IN | WEIGHT: 152 LBS

## 2024-08-21 PROCEDURE — 0502F SUBSEQUENT PRENATAL CARE: CPT

## 2024-08-23 LAB
APPEARANCE: CLEAR
BILIRUBIN URINE: NEGATIVE
BLOOD URINE: ABNORMAL
COLOR: YELLOW
GLUCOSE QUALITATIVE U: NEGATIVE
KETONES URINE: 15
LEUKOCYTE ESTERASE URINE: NEGATIVE
NITRITE URINE: NEGATIVE
PH URINE: 6
PROTEIN URINE: NEGATIVE
SPECIFIC GRAVITY URINE: >=1.03
UROBILINOGEN URINE: 0.2 (ref 0.2–?)

## 2024-08-26 LAB
GP B STREP DNA SPEC QL NAA+PROBE: NOT DETECTED
SOURCE GBS: NORMAL

## 2024-08-28 ENCOUNTER — APPOINTMENT (OUTPATIENT)
Dept: OBGYN | Facility: CLINIC | Age: 34
End: 2024-08-28
Payer: MEDICAID

## 2024-08-28 ENCOUNTER — TRANSCRIPTION ENCOUNTER (OUTPATIENT)
Age: 34
End: 2024-08-28

## 2024-08-28 ENCOUNTER — NON-APPOINTMENT (OUTPATIENT)
Age: 34
End: 2024-08-28

## 2024-08-28 VITALS
HEIGHT: 62 IN | BODY MASS INDEX: 28.52 KG/M2 | DIASTOLIC BLOOD PRESSURE: 70 MMHG | SYSTOLIC BLOOD PRESSURE: 114 MMHG | WEIGHT: 155 LBS

## 2024-08-28 LAB
APPEARANCE: CLEAR
BILIRUBIN URINE: NEGATIVE
BLOOD URINE: NEGATIVE
COLOR: YELLOW
GLUCOSE QUALITATIVE U: NEGATIVE
KETONES URINE: NEGATIVE
LEUKOCYTE ESTERASE URINE: NEGATIVE
NITRITE URINE: NEGATIVE
PH URINE: 6.5
PROTEIN URINE: NEGATIVE
SPECIFIC GRAVITY URINE: 1.02
UROBILINOGEN URINE: 0.2 (ref 0.2–?)

## 2024-08-28 PROCEDURE — 0502F SUBSEQUENT PRENATAL CARE: CPT

## 2024-08-29 ENCOUNTER — OUTPATIENT (OUTPATIENT)
Dept: OUTPATIENT SERVICES | Facility: HOSPITAL | Age: 34
LOS: 1 days | End: 2024-08-29
Payer: MEDICAID

## 2024-08-29 DIAGNOSIS — O26.899 OTHER SPECIFIED PREGNANCY RELATED CONDITIONS, UNSPECIFIED TRIMESTER: ICD-10-CM

## 2024-08-29 PROCEDURE — 86900 BLOOD TYPING SEROLOGIC ABO: CPT

## 2024-08-29 PROCEDURE — 86850 RBC ANTIBODY SCREEN: CPT

## 2024-08-29 PROCEDURE — 85025 COMPLETE CBC W/AUTO DIFF WBC: CPT

## 2024-08-29 PROCEDURE — 86780 TREPONEMA PALLIDUM: CPT

## 2024-08-29 PROCEDURE — 86901 BLOOD TYPING SEROLOGIC RH(D): CPT

## 2024-08-29 PROCEDURE — 36415 COLL VENOUS BLD VENIPUNCTURE: CPT

## 2024-08-31 ENCOUNTER — OUTPATIENT (OUTPATIENT)
Dept: INPATIENT UNIT | Facility: HOSPITAL | Age: 34
LOS: 1 days | End: 2024-08-31
Payer: MEDICAID

## 2024-08-31 VITALS
RESPIRATION RATE: 22 BRPM | SYSTOLIC BLOOD PRESSURE: 125 MMHG | HEART RATE: 101 BPM | TEMPERATURE: 98 F | DIASTOLIC BLOOD PRESSURE: 82 MMHG

## 2024-08-31 VITALS — DIASTOLIC BLOOD PRESSURE: 78 MMHG | SYSTOLIC BLOOD PRESSURE: 130 MMHG

## 2024-08-31 DIAGNOSIS — O26.899 OTHER SPECIFIED PREGNANCY RELATED CONDITIONS, UNSPECIFIED TRIMESTER: ICD-10-CM

## 2024-08-31 PROCEDURE — G0463: CPT

## 2024-08-31 PROCEDURE — 59025 FETAL NON-STRESS TEST: CPT

## 2024-09-03 ENCOUNTER — NON-APPOINTMENT (OUTPATIENT)
Age: 34
End: 2024-09-03

## 2024-09-03 DIAGNOSIS — O47.1 FALSE LABOR AT OR AFTER 37 COMPLETED WEEKS OF GESTATION: ICD-10-CM

## 2024-09-03 DIAGNOSIS — Z3A.37 37 WEEKS GESTATION OF PREGNANCY: ICD-10-CM

## 2024-09-04 ENCOUNTER — APPOINTMENT (OUTPATIENT)
Dept: OBGYN | Facility: CLINIC | Age: 34
End: 2024-09-04
Payer: MEDICAID

## 2024-09-04 VITALS
BODY MASS INDEX: 28.16 KG/M2 | DIASTOLIC BLOOD PRESSURE: 74 MMHG | WEIGHT: 153 LBS | HEIGHT: 62 IN | SYSTOLIC BLOOD PRESSURE: 124 MMHG

## 2024-09-04 DIAGNOSIS — Z34.93 ENCOUNTER FOR SUPERVISION OF NORMAL PREGNANCY, UNSPECIFIED, THIRD TRIMESTER: ICD-10-CM

## 2024-09-04 LAB
APPEARANCE: CLEAR
BILIRUBIN URINE: NEGATIVE
BLOOD URINE: ABNORMAL
COLOR: YELLOW
GLUCOSE QUALITATIVE U: NEGATIVE
KETONES URINE: NEGATIVE
LEUKOCYTE ESTERASE URINE: ABNORMAL
NITRITE URINE: NEGATIVE
PH URINE: 6.5
PROTEIN URINE: NEGATIVE
SPECIFIC GRAVITY URINE: 1.02
UROBILINOGEN URINE: 0.2 (ref 0.2–?)

## 2024-09-04 PROCEDURE — 0502F SUBSEQUENT PRENATAL CARE: CPT

## 2024-09-09 ENCOUNTER — TRANSCRIPTION ENCOUNTER (OUTPATIENT)
Age: 34
End: 2024-09-09

## 2024-09-09 ENCOUNTER — INPATIENT (INPATIENT)
Facility: HOSPITAL | Age: 34
LOS: 0 days | Discharge: HOME | DRG: 833 | End: 2024-09-10
Attending: STUDENT IN AN ORGANIZED HEALTH CARE EDUCATION/TRAINING PROGRAM | Admitting: STUDENT IN AN ORGANIZED HEALTH CARE EDUCATION/TRAINING PROGRAM
Payer: MEDICAID

## 2024-09-09 VITALS — DIASTOLIC BLOOD PRESSURE: 80 MMHG | SYSTOLIC BLOOD PRESSURE: 135 MMHG | HEART RATE: 75 BPM

## 2024-09-09 DIAGNOSIS — O26.893 OTHER SPECIFIED PREGNANCY RELATED CONDITIONS, THIRD TRIMESTER: ICD-10-CM

## 2024-09-09 DIAGNOSIS — O26.899 OTHER SPECIFIED PREGNANCY RELATED CONDITIONS, UNSPECIFIED TRIMESTER: ICD-10-CM

## 2024-09-09 LAB
BASOPHILS # BLD AUTO: 0.04 K/UL — SIGNIFICANT CHANGE UP (ref 0–0.2)
BASOPHILS NFR BLD AUTO: 0.6 % — SIGNIFICANT CHANGE UP (ref 0–2)
BLD GP AB SCN SERPL QL: SIGNIFICANT CHANGE UP
EOSINOPHIL # BLD AUTO: 0.04 K/UL — SIGNIFICANT CHANGE UP (ref 0–0.5)
EOSINOPHIL NFR BLD AUTO: 0.6 % — SIGNIFICANT CHANGE UP (ref 0–6)
HCT VFR BLD CALC: 33.4 % — LOW (ref 34.5–45)
HGB BLD-MCNC: 11.3 G/DL — LOW (ref 11.5–15.5)
IMM GRANULOCYTES NFR BLD AUTO: 0.6 % — SIGNIFICANT CHANGE UP (ref 0–0.9)
LYMPHOCYTES # BLD AUTO: 1.46 K/UL — SIGNIFICANT CHANGE UP (ref 1–3.3)
LYMPHOCYTES # BLD AUTO: 23 % — SIGNIFICANT CHANGE UP (ref 13–44)
MCHC RBC-ENTMCNC: 30.6 PG — SIGNIFICANT CHANGE UP (ref 27–34)
MCHC RBC-ENTMCNC: 33.8 GM/DL — SIGNIFICANT CHANGE UP (ref 32–36)
MCV RBC AUTO: 90.5 FL — SIGNIFICANT CHANGE UP (ref 80–100)
MONOCYTES # BLD AUTO: 0.49 K/UL — SIGNIFICANT CHANGE UP (ref 0–0.9)
MONOCYTES NFR BLD AUTO: 7.7 % — SIGNIFICANT CHANGE UP (ref 2–14)
NEUTROPHILS # BLD AUTO: 4.28 K/UL — SIGNIFICANT CHANGE UP (ref 1.8–7.4)
NEUTROPHILS NFR BLD AUTO: 67.5 % — SIGNIFICANT CHANGE UP (ref 43–77)
PLATELET # BLD AUTO: 191 K/UL — SIGNIFICANT CHANGE UP (ref 150–400)
RBC # BLD: 3.69 M/UL — LOW (ref 3.8–5.2)
RBC # FLD: 13.2 % — SIGNIFICANT CHANGE UP (ref 10.3–14.5)
WBC # BLD: 6.35 K/UL — SIGNIFICANT CHANGE UP (ref 3.8–10.5)
WBC # FLD AUTO: 6.35 K/UL — SIGNIFICANT CHANGE UP (ref 3.8–10.5)

## 2024-09-09 PROCEDURE — 59400 OBSTETRICAL CARE: CPT | Mod: U9

## 2024-09-09 RX ORDER — ACETAMINOPHEN 325 MG/1
975 TABLET ORAL
Refills: 0 | Status: DISCONTINUED | OUTPATIENT
Start: 2024-09-09 | End: 2024-09-10

## 2024-09-09 RX ORDER — OXYCODONE HYDROCHLORIDE 5 MG/1
5 TABLET ORAL
Refills: 0 | Status: DISCONTINUED | OUTPATIENT
Start: 2024-09-09 | End: 2024-09-10

## 2024-09-09 RX ORDER — HYDROCORTISONE 1 %
1 OINTMENT (GRAM) TOPICAL EVERY 6 HOURS
Refills: 0 | Status: DISCONTINUED | OUTPATIENT
Start: 2024-09-09 | End: 2024-09-10

## 2024-09-09 RX ORDER — DIPHENHYDRAMINE HCL 50 MG
25 CAPSULE ORAL EVERY 6 HOURS
Refills: 0 | Status: DISCONTINUED | OUTPATIENT
Start: 2024-09-09 | End: 2024-09-10

## 2024-09-09 RX ORDER — OXYCODONE HYDROCHLORIDE 5 MG/1
5 TABLET ORAL ONCE
Refills: 0 | Status: DISCONTINUED | OUTPATIENT
Start: 2024-09-09 | End: 2024-09-10

## 2024-09-09 RX ORDER — CHLORHEXIDINE GLUCONATE 40 MG/ML
1 SOLUTION TOPICAL DAILY
Refills: 0 | Status: DISCONTINUED | OUTPATIENT
Start: 2024-09-09 | End: 2024-09-09

## 2024-09-09 RX ORDER — IBUPROFEN 600 MG
600 TABLET ORAL EVERY 6 HOURS
Refills: 0 | Status: COMPLETED | OUTPATIENT
Start: 2024-09-09 | End: 2025-08-08

## 2024-09-09 RX ORDER — WITCH HAZEL 1 G/ML
1 LIQUID TOPICAL EVERY 4 HOURS
Refills: 0 | Status: DISCONTINUED | OUTPATIENT
Start: 2024-09-09 | End: 2024-09-10

## 2024-09-09 RX ORDER — VITAMIN A, ASCORBIC ACID, VITAMIN D, .ALPHA.-TOCOPHEROL, THIAMINE MONONITRATE, RIBOFLAVIN, NIACIN, PYRIDOXINE HYDROCHLORIDE, FOLIC ACID, CYANOCOBALAMIN, CALCIUM, IRON, MAGNESIUM, ZINC, AND COPPER 2700; 70; 400; 30; 1.6; 1.8; 18; 2.5; 1; 12; 100; 65; 25; 25; 2 [IU]/1; MG/1; [IU]/1; [IU]/1; MG/1; MG/1; MG/1; MG/1; MG/1; UG/1; MG/1; MG/1; MG/1; MG/1; MG/1
1 TABLET ORAL DAILY
Refills: 0 | Status: DISCONTINUED | OUTPATIENT
Start: 2024-09-09 | End: 2024-09-10

## 2024-09-09 RX ORDER — IBUPROFEN 600 MG
1 TABLET ORAL
Qty: 28 | Refills: 0
Start: 2024-09-09 | End: 2024-09-15

## 2024-09-09 RX ORDER — IBUPROFEN 600 MG
600 TABLET ORAL EVERY 6 HOURS
Refills: 0 | Status: DISCONTINUED | OUTPATIENT
Start: 2024-09-09 | End: 2024-09-10

## 2024-09-09 RX ORDER — TETANUS TOXOID, REDUCED DIPHTHERIA TOXOID AND ACELLULAR PERTUSSIS VACCINE, ADSORBED 5; 2.5; 8; 8; 2.5 [IU]/.5ML; [IU]/.5ML; UG/.5ML; UG/.5ML; UG/.5ML
0.5 SUSPENSION INTRAMUSCULAR ONCE
Refills: 0 | Status: DISCONTINUED | OUTPATIENT
Start: 2024-09-09 | End: 2024-09-10

## 2024-09-09 RX ORDER — OXYTOCIN 10 UNIT/ML
333.33 AMPUL (ML) INJECTION
Qty: 20 | Refills: 0 | Status: COMPLETED | OUTPATIENT
Start: 2024-09-09 | End: 2024-09-09

## 2024-09-09 RX ORDER — OXYTOCIN 10 UNIT/ML
41.67 AMPUL (ML) INJECTION
Qty: 20 | Refills: 0 | Status: DISCONTINUED | OUTPATIENT
Start: 2024-09-09 | End: 2024-09-10

## 2024-09-09 RX ORDER — ACETAMINOPHEN 325 MG/1
2 TABLET ORAL
Qty: 56 | Refills: 0
Start: 2024-09-09 | End: 2024-09-15

## 2024-09-09 RX ORDER — KETOROLAC TROMETHAMINE 30 MG/ML
30 INJECTION, SOLUTION INTRAMUSCULAR ONCE
Refills: 0 | Status: DISCONTINUED | OUTPATIENT
Start: 2024-09-09 | End: 2024-09-09

## 2024-09-09 RX ORDER — LANOLIN
1 OINTMENT (GRAM) TOPICAL EVERY 6 HOURS
Refills: 0 | Status: DISCONTINUED | OUTPATIENT
Start: 2024-09-09 | End: 2024-09-10

## 2024-09-09 RX ORDER — SODIUM CITRATE AND CITRIC ACID MONOHYDRATE 334; 500 MG/5ML; MG/5ML
30 SOLUTION ORAL ONCE
Refills: 0 | Status: DISCONTINUED | OUTPATIENT
Start: 2024-09-09 | End: 2024-09-09

## 2024-09-09 RX ORDER — OXYTOCIN 10 UNIT/ML
2 AMPUL (ML) INJECTION
Qty: 30 | Refills: 0 | Status: DISCONTINUED | OUTPATIENT
Start: 2024-09-09 | End: 2024-09-10

## 2024-09-09 RX ORDER — SODIUM CHLORIDE 9 MG/ML
3 INJECTION INTRAMUSCULAR; INTRAVENOUS; SUBCUTANEOUS EVERY 8 HOURS
Refills: 0 | Status: DISCONTINUED | OUTPATIENT
Start: 2024-09-09 | End: 2024-09-10

## 2024-09-09 RX ORDER — MENTHOL/CETYLPYRD CL 3 MG
1 LOZENGE MUCOUS MEMBRANE EVERY 6 HOURS
Refills: 0 | Status: DISCONTINUED | OUTPATIENT
Start: 2024-09-09 | End: 2024-09-10

## 2024-09-09 RX ORDER — PRAMOXINE HCL 1 %
1 GEL (GRAM) TOPICAL EVERY 4 HOURS
Refills: 0 | Status: DISCONTINUED | OUTPATIENT
Start: 2024-09-09 | End: 2024-09-10

## 2024-09-09 RX ADMIN — Medication 600 MILLIGRAM(S): at 23:50

## 2024-09-09 RX ADMIN — Medication 2 MILLIUNIT(S)/MIN: at 08:45

## 2024-09-09 RX ADMIN — KETOROLAC TROMETHAMINE 30 MILLIGRAM(S): 30 INJECTION, SOLUTION INTRAMUSCULAR at 14:04

## 2024-09-09 RX ADMIN — ACETAMINOPHEN 975 MILLIGRAM(S): 325 TABLET ORAL at 20:48

## 2024-09-09 RX ADMIN — Medication 600 MILLIGRAM(S): at 18:04

## 2024-09-09 RX ADMIN — Medication 1000 MILLIUNIT(S)/MIN: at 14:04

## 2024-09-09 NOTE — DISCHARGE NOTE OB - CARE PLAN
1 Principal Discharge DX:	Normal vaginal delivery  Assessment and plan of treatment:	Please call your provider to schedule postpartum visit in 6 weeks. Take medications as directed, regular diet, activity as tolerated. Exclusive breast feeding for the first 6 months is recommended. Nothing per vagina for 6 weeks (incl. sex, douching, etc). If you have additional concerns, please inform your provider.

## 2024-09-09 NOTE — DISCHARGE NOTE OB - PATIENT PORTAL LINK FT
You can access the FollowMyHealth Patient Portal offered by [facility] by registering at the following website: [email or web address]. By joining [facility]’s FollowMyHealth portal, you will also be able to view your health information using other applications (apps) compatible with our system.

## 2024-09-09 NOTE — OB RN DELIVERY SUMMARY - NS_SEPSISRSKCALC_OBGYN_ALL_OB_FT
EOS calculated successfully. EOS Risk Factor: 0.5/1000 live births ([facility] national incidence); GA=39w1d; Temp=98.78; ROM=8.95; GBS='Negative'; Antibiotics='Broad spectrum antibiotics > 4 hrs prior to birth'

## 2024-09-09 NOTE — OB PROVIDER H&P - NSHPPHYSICALEXAM_GEN_ALL_CORE
T(C): --  HR: 75 (09-09-24 @ 05:38) (75 - 75)  BP: 135/80 (09-09-24 @ 05:38) (135/80 - 135/80)  RR: --  SpO2: --    Gen: NAD, well-appearing, AAOx3   Abd: Soft, gravid  Ext: non-tender, non-edematous  SSE: gross pooling of clear fluid  SVE:  4/70/-1  Bedside sono: vertex, posterior  FHT: 120 bpm, moderate variability, +accels, -deccels  [place]: q5

## 2024-09-09 NOTE — OB PROVIDER H&P - NSLOWPPHRISK_OBGYN_A_OB
No previous uterine incision/[place] Pregnancy/Less than or equal to 4 previous vaginal births/No known bleeding disorder/No history of postpartum hemorrhage

## 2024-09-09 NOTE — OB PROVIDER IHI INDUCTION/AUGMENTATION NOTE - NS_OBIHITYPE_OBGYN_ALL_OB
Induction
Requires supervison due to deteriorating mental status.../Stroke/TBI (neurological/cognitive impairment)

## 2024-09-09 NOTE — OB PROVIDER H&P - ASSESSMENT
34y  at 39w1d GA by LMP who presents to L&D for SROM @ term.     A/P:   -Admit to L&D  -Consent  -Admission labs  -NPO, except ice chips   -IV fluids  -Labor: SROM 0430. Latent labor. [place] q5   -Fetus: Cat I tracing. Continuous toco and fetal monitoring.   -GBS: Negative, no GBS ppx required   -Analgesia: declines Epidural    to be Discussed with Dr. Herzog

## 2024-09-09 NOTE — OB PROVIDER DELIVERY SUMMARY - NSPROVIDERDELIVERYNOTE_OBGYN_ALL_OB_FT
Patient felt rectal pressure and was found to be fully dilated. She pushed effectively and delivered a viable female infant at 1327. Vertex delivered without difficulty, nuchal cord x1, shoulders then delivered without difficulty. Placenta delivered spontaneously and intact at 1331. Pitocin started. Excellent hemostasis was achieved. Uterus, cervix, perineum and vagina were inspected and a clitoral laceration was noted and managed with surgicel.     Apgars: 8/9  EBL: 80

## 2024-09-09 NOTE — DISCHARGE NOTE OB - DISCHARGE DATE
10-Sep-2024 O-L Flap Text: The defect edges were debeveled with a #15 scalpel blade.  Given the location of the defect, shape of the defect and the proximity to free margins an O-L flap was deemed most appropriate.  Using a sterile surgical marker, an appropriate advancement flap was drawn incorporating the defect and placing the expected incisions within the relaxed skin tension lines where possible.    The area thus outlined was incised deep to adipose tissue with a #15 scalpel blade.  The skin margins were undermined to an appropriate distance in all directions utilizing iris scissors.

## 2024-09-09 NOTE — OB PROVIDER DELIVERY SUMMARY - NSSELHIDDEN_OBGYN_ALL_OB_FT
[NS_DeliveryAttending1_OBGYN_ALL_OB_FT:[id number]=],[NS_DeliveryAssist1_OBGYN_ALL_OB_FT:[id number]=]

## 2024-09-09 NOTE — OB RN DELIVERY SUMMARY - NSSELHIDDEN_OBGYN_ALL_OB_FT
[NS_DeliveryAttending1_OBGYN_ALL_OB_FT:[id number]=],[NS_DeliveryAssist1_OBGYN_ALL_OB_FT:[id number]=],[NS_DeliveryRN_OBGYN_ALL_OB_FT:[id number]=]

## 2024-09-09 NOTE — DISCHARGE NOTE OB - MEDICATION SUMMARY - MEDICATIONS TO TAKE
I will START or STAY ON the medications listed below when I get home from the hospital:    ibuprofen 600 mg oral tablet  -- 1 tab(s) by mouth every 6 hours  -- Indication: For pain    Tylenol 325 mg oral tablet  -- 2 tab(s) by mouth every 6 hours  -- Indication: For pain

## 2024-09-09 NOTE — OB PROVIDER H&P - HISTORY OF PRESENT ILLNESS
34y  at 39w1d GA by LMP who presents to L&D for LOF since 430. Patient denies vaginal bleeding. She feels intermittent ctx. She endorses good fetal movement. Denies fevers, chills, nausea, vomiting, chest pain, SOB, dizziness and headache. No other complaints at this time.   KEILA: 9/15/24  LMP: 12/10/23    Prenatal course uncomplicated.      POB:   x2 2021  PGYN: -fibroids, -ovarian cysts, denies STD hx, denies abnormal PAPs   PMH: Denies  PSH: Denies  SH: Denies EtOH, tobacco and illicit drug use during this pregnancy; feels safe at home   Meds: PNVs  Allergies: NKDA

## 2024-09-09 NOTE — DISCHARGE NOTE OB - CARE PROVIDER_API CALL
Jackie Rosado  Obstetrics and Gynecology  [address] [facility], [address] [address]  Phone: [phone]  Fax: [phone]  Established Patient  Follow Up Time: 1 month

## 2024-09-09 NOTE — OB RN PATIENT PROFILE - SOURCE OF INFORMATION, OB PROFILE
Children younger than 13 must be in the rear seat of a vehicle when available and properly restrained.  If you have an active [facility] account, please look for your well child questionnaire to come to your [employer] account before your next well child visit.  
patient

## 2024-09-09 NOTE — OB PROVIDER LABOR PROGRESS NOTE - ASSESSMENT
35yo  at 39w1d admitted with labor and rupture of membranes, being expectantly managed    -Afebrile, VSS  -FHT cat I, continue EFM  -SVE /-2  -Does not desire epidural at this time  -GBS negative      Marly Guevara

## 2024-09-10 VITALS
RESPIRATION RATE: 18 BRPM | OXYGEN SATURATION: 99 % | TEMPERATURE: 98 F | HEART RATE: 65 BPM | DIASTOLIC BLOOD PRESSURE: 71 MMHG | SYSTOLIC BLOOD PRESSURE: 128 MMHG

## 2024-09-10 LAB
HCT VFR BLD CALC: 30.9 % — LOW (ref 34.5–45)
HGB BLD-MCNC: 10 G/DL — LOW (ref 11.5–15.5)
T PALLIDUM AB TITR SER: NEGATIVE — SIGNIFICANT CHANGE UP

## 2024-09-10 PROCEDURE — T1013: CPT

## 2024-09-10 PROCEDURE — 85014 HEMATOCRIT: CPT

## 2024-09-10 PROCEDURE — 86780 TREPONEMA PALLIDUM: CPT

## 2024-09-10 PROCEDURE — 85018 HEMOGLOBIN: CPT

## 2024-09-10 PROCEDURE — 86850 RBC ANTIBODY SCREEN: CPT

## 2024-09-10 PROCEDURE — 59050 FETAL MONITOR W/REPORT: CPT

## 2024-09-10 PROCEDURE — 86900 BLOOD TYPING SEROLOGIC ABO: CPT

## 2024-09-10 PROCEDURE — 36415 COLL VENOUS BLD VENIPUNCTURE: CPT

## 2024-09-10 PROCEDURE — 86901 BLOOD TYPING SEROLOGIC RH(D): CPT

## 2024-09-10 PROCEDURE — 85025 COMPLETE CBC W/AUTO DIFF WBC: CPT

## 2024-09-10 RX ADMIN — Medication 600 MILLIGRAM(S): at 05:30

## 2024-09-10 RX ADMIN — ACETAMINOPHEN 975 MILLIGRAM(S): 325 TABLET ORAL at 14:16

## 2024-09-10 RX ADMIN — VITAMIN A, ASCORBIC ACID, VITAMIN D, .ALPHA.-TOCOPHEROL, THIAMINE MONONITRATE, RIBOFLAVIN, NIACIN, PYRIDOXINE HYDROCHLORIDE, FOLIC ACID, CYANOCOBALAMIN, CALCIUM, IRON, MAGNESIUM, ZINC, AND COPPER 1 TABLET(S): 2700; 70; 400; 30; 1.6; 1.8; 18; 2.5; 1; 12; 100; 65; 25; 25; 2 TABLET ORAL at 11:36

## 2024-09-10 RX ADMIN — ACETAMINOPHEN 975 MILLIGRAM(S): 325 TABLET ORAL at 02:21

## 2024-09-10 RX ADMIN — ACETAMINOPHEN 975 MILLIGRAM(S): 325 TABLET ORAL at 08:12

## 2024-09-10 RX ADMIN — Medication 600 MILLIGRAM(S): at 11:35

## 2024-09-10 NOTE — PROGRESS NOTE ADULT - SUBJECTIVE AND OBJECTIVE BOX
SANDRA REYESCENDALES is a 34y  now PPD#1 s/p spontaneous vaginal delivery at 39w1d gestation, uncomplicated.    S:    No acute events overnight.   The patient has no complaints.  Pain controlled with current treatment regimen.   She is ambulating without difficulty and tolerating PO.   + flatus/-BM/+ voiding   She endorses appropriate lochia, which is decreasing.   She denies fevers, chills, nausea and vomiting.   She denies lightheadedness, dizziness, palpitations, chest pain and SOB.     O:    T(C): 36.4 (09-10-24 @ 04:00), Max: 37.1 (24 @ 10:30)  HR: 65 (09-10-24 @ 04:00) (51 - 75)  BP: 128/71 (09-10-24 @ 04:00) (102/70 - 137/70)  RR: 18 (09-10-24 @ 04:00) (15 - 18)  SpO2: 99% (09-10-24 @ 04:00) (95% - 99%)    Gen: NAD, AOx3  Pulm: Breathing comfortably on room air  Abdomen:  Soft, non-tender, non-distended   Uterus:  Fundus firm below umbilicus  VE:  Expectant lochia  Ext:  Non-tender and non-edematous                          11.3   6.35  )-----------( 191      ( 09 Sep 2024 06:13 )             33.4

## 2024-09-10 NOTE — PROGRESS NOTE ADULT - ASSESSMENT
A/P:    SANDRA REYESCENDALES is a 34y  now PPD#1 s/p spontaneous vaginal delivery at 39w1d gestation, uncomplicated.    -Vital signs stable  -Hgb: 12.2 -> AM labs pending   -Voiding, tolerating PO, bowel function nml   -Advance care as tolerated   -Continue routine postpartum care and education  -Healthy female infant  -Dispo: Pt is stable, doing well and meeting all postpartum milestones. Possible discharge to home today pending attending approval.

## 2024-09-10 NOTE — PROGRESS NOTE ADULT - ATTENDING COMMENTS
SANDRA REYESCENDALES is a 34y  now PPD#1 s/p spontaneous vaginal delivery at 39w1d gestation due to SROM, uncomplicated.      Plan:  ICU Vital Signs Last 24 Hrs  T(C): 36.4 (10 Sep 2024 04:00), Max: 37.1 (09 Sep 2024 10:30)  T(F): 97.5 (10 Sep 2024 04:00), Max: 98.78 (09 Sep 2024 10:30)  HR: 65 (10 Sep 2024 04:00) (51 - 72)  BP: 128/71 (10 Sep 2024 04:00) (102/70 - 137/70)  RR: 18 (10 Sep 2024 04:00) (15 - 18)  SpO2: 99% (10 Sep 2024 04:00) (95% - 99%)    Healthy female infant  Continue routine postpartum care, likely discharge today

## 2024-09-12 ENCOUNTER — APPOINTMENT (OUTPATIENT)
Dept: OBGYN | Facility: CLINIC | Age: 34
End: 2024-09-12

## 2024-09-20 ENCOUNTER — NON-APPOINTMENT (OUTPATIENT)
Age: 34
End: 2024-09-20

## 2024-09-20 ENCOUNTER — APPOINTMENT (OUTPATIENT)
Dept: OBGYN | Facility: CLINIC | Age: 34
End: 2024-09-20
Payer: MEDICAID

## 2024-09-20 VITALS — WEIGHT: 136.38 LBS | BODY MASS INDEX: 25.1 KG/M2 | HEIGHT: 62 IN

## 2024-09-20 DIAGNOSIS — Z78.9 OTHER SPECIFIED HEALTH STATUS: ICD-10-CM

## 2024-09-20 PROCEDURE — 0503F POSTPARTUM CARE VISIT: CPT

## 2024-09-20 NOTE — HISTORY OF PRESENT ILLNESS
[N] : Patient is not sexually active [Y] : Positive pregnancy history [No] : Patient does not have concerns regarding sex [Previously active] : previously active [Men] : men [Menarche Age: ____] : age at menarche was [unfilled] [PapSmeardate] : 02/12/24 [TextBox_31] : NEG [HPVDate] : 02/12/24 [TextBox_78] : NEG [LMPDate] : 12/10/23 [PGHxTotal] : 3 [[facility] Hospit[facility]Term] : 3 [[facility] Centeriving] : 3 [FreeTextEntry1] : 12/10/23

## 2024-09-20 NOTE — DISCUSSION/SUMMARY
[FreeTextEntry1] : 33 y/o now  s/p  on 24.  - [place] screen negative, mood appropriate, feels well taking care of baby - discussed continuing prenatal vitamins - discussed continued BF for 6 months, exclusively if possible (benefits explained) - discussed adequate diet and physical activity  - Contraception: considering Mirena IUD vs Nexplanon  - Last PAP: 2023 NILM HPV NEG   She verbalized understanding and agreement with above counseling regarding differential diagnosis, evaluation, and plan.  She was given time for questions/concerns which were all answered to her apparent satisfaction. All designated lab work for today drawn in office.   RTO 4w for final PP visit

## 2024-10-18 ENCOUNTER — APPOINTMENT (OUTPATIENT)
Dept: OBGYN | Facility: CLINIC | Age: 34
End: 2024-10-18

## 2024-10-18 VITALS
BODY MASS INDEX: 24.11 KG/M2 | HEIGHT: 62 IN | WEIGHT: 131 LBS | SYSTOLIC BLOOD PRESSURE: 110 MMHG | DIASTOLIC BLOOD PRESSURE: 72 MMHG

## 2024-10-18 PROCEDURE — 0503F POSTPARTUM CARE VISIT: CPT
